# Patient Record
(demographics unavailable — no encounter records)

---

## 2025-01-22 NOTE — REASON FOR VISIT
[Initial Evaluation] : an initial evaluation [Patient] : patient [Mother] : mother [FreeTextEntry1] : Right distal radius and ulna fracture sustained on 1/10/2025

## 2025-01-22 NOTE — REVIEW OF SYSTEMS
[Change in Activity] : change in activity [Joint Pains] : arthralgias [Joint Swelling] : joint swelling  [Fever Above 102] : no fever [Malaise] : no malaise [Itching] : no itching [Redness] : no redness [Murmur] : no murmur [Congestion] : no congestion [Vomiting] : no vomiting [Diarrhea] : no diarrhea [Constipation] : no constipation [Kidney Infection] : no kidney infection [Bladder Infection] : no bladder infection [Sleep Disturbances] : ~T no sleep disturbances

## 2025-01-22 NOTE — HISTORY OF PRESENT ILLNESS
[FreeTextEntry1] : Won is an 06-[id number] male with a right distal radius and ulna fracture sustained on 1/10/2025.  Per report he tripped and fell on an outstretched hand.  He had immediate pain and discomfort and presented to [facility] where radiographs were obtained and the above fracture was noted.  He was placed into a long-arm cast.  Prior to leaving the emergency department his cast was windowed at the antecubital space.  It was recommended he follow-up with pediatric orthopedics.  Today, his mother reports he is overall doing well.  He has been trying to utilize his arm.  She denies any need for pain medication.  He can flex and extend his fingers without discomfort.  They present today for initial evaluation of his right distal radius and ulna fracture.

## 2025-01-22 NOTE — ASSESSMENT
[FreeTextEntry1] : 18-month-old male with a right distal radius and ulna fracture sustained on 1/10/2025, 7 days ago, when he tripped and fell.  -We discussed the history, physical exam, and all available radiographs at length during today's visit with patient and his parent/guardian who served as an independent historian due to child's age and unreliable nature of history. -Documentation from [facility] was reviewed today -2 views of the right forearm, one view of the right wrist and 2 views of the right elbow radiographs were obtained at [facility] on 1/10/25 and independently reviewed during today's visit.  Acute fractures of the distal radius and ulna. No additional fractures. No dislocation. Joint spaces are maintained. Subcutaneous soft tissue swelling over the volar aspect of the right wrist. No elbow joint effusion. Cast windowing in the antecubital fossa noted with concern for persistent skin indentation. -The etiology, pathoanatomy, treatment modalities, and expected natural history of the injury were discussed at length today. -Clinically, he is doing well and tolerating his long-arm cast without difficulty. He denies any pain in the cast at this time. -Due to concern for cast technique and overall quality, his long-arm cast was removed today for evaluation. An area of healing superficial skin injury noted over the volar aspect of the proximal forearm. No signs of bleeding or infection.  Xeroform was applied to the area.  Need for observation and possible scarring was discussed. -A new well-padded and molded long-arm cast was then applied to protect the skin injury and immobilize the fracture.  Cast care instructions were reviewed. -Right wrist 3 view radiographs were obtained and independently reviewed during today's visit.  Continued visualization of a distal radius and ulna fracture.  No signs of interval healing.  Fractures are well aligned.  Skeletally immature individual. -Nonweightbearing on the right upper extremity.  Sling at all times. -OTC NSAIDs as needed -Absolutely no playgrounds, bouncy houses or trampolines, etc. -We will plan to see him back in clinic in approximately 2 weeks for repeat clinical evaluation and new right wrist radiographs out of cast.  Anticipate transition to a wrist immobilizer at that time.   All questions and concerns were addressed today. Parent and patient verbalize understanding and agree with plan of care.   I, Jazlyn Melgoza, have acted as a scribe and documented the above information for Dr. Madera.   This note was created using Dragon Voice Recognition Software and may have been partially created using AI software which was then reviewed and edited to the best of my ability. Sporadic inaccurate translation may have occurred. If there are any questions about content of the note, please contact the office for clarification.

## 2025-01-22 NOTE — END OF VISIT
[FreeTextEntry3] : IBaron MD, personally saw and evaluated the patient and developed the plan as documented above. I concur or have edited the note as appropriate.

## 2025-01-22 NOTE — HISTORY OF PRESENT ILLNESS
[FreeTextEntry1] : Won is an 23-[id number]-rvg male with a right distal radius and ulna fracture sustained on 1/10/2025.  Per report he tripped and fell on an outstretched hand.  He had immediate pain and discomfort and presented to [facility] where radiographs were obtained and the above fracture was noted.  He was placed into a long-arm cast.  Prior to leaving the emergency department his cast was windowed at the antecubital space.  It was recommended he follow-up with pediatric orthopedics.  Today, his mother reports he is overall doing well.  He has been trying to utilize his arm.  She denies any need for pain medication.  He can flex and extend his fingers without discomfort.  They present today for initial evaluation of his right distal radius and ulna fracture.

## 2025-01-22 NOTE — PHYSICAL EXAM
[FreeTextEntry1] : GENERAL: alert, cooperative, in NAD SKIN: The skin is intact, warm, pink and dry over the area examined. EYES: Normal conjunctiva, normal eyelids and pupils were equal and round. ENT: normal ears, normal nose and normal lips. CARDIOVASCULAR: brisk capillary refill, but no peripheral edema. RESPIRATORY: The patient is in no apparent respiratory distress. They're taking full deep breaths without use of accessory muscles or evidence of audible wheezes or stridor without the use of a stethoscope. Normal respiratory effort. ABDOMEN: not examined.   RUE:  -Long arm cast was in place. Due to cast quality and radiographic appearance, cast was removed today for examination. -Healing superficial skin injury noted over the volar aspect of the proximal forearm. No signs of bleeding or infection. -Mild swelling about the wrist -Expected discomfort about the distal radius and ulna -Formal range of motion was deferred today though patient is seen actively moving elbow -Able to fully flex and extend all fingers without discomfort -Formal neuro exam deferred due to patient age.  Patient is seen moving all fingers without limitation. -Fingers are warm and appear well perfused with brisk capillary refill -2+ radial pulse -Sensation is grossly intact throughout the upper extremity, however, exam is limited due to patient age -No evidence of lymphedema

## 2025-01-22 NOTE — DATA REVIEWED
[de-identified] : 2 views of the right forearm, one view of the right wrist and 2 views of the right elbow radiographs were obtained at [facility] on 1/10/25 and independently reviewed during today's visit.  Acute fractures of the distal radius and ulna. No additional fractures. No dislocation. Joint spaces are maintained. Subcutaneous soft tissue swelling over the volar aspect of the right wrist. No elbow joint effusion. Cast windowing in the antecubital fossa noted with concern for persistent skin indentation.  Right wrist 3 view radiographs were obtained and independently reviewed during today's visit.  Continued visualization of a distal radius and ulna fracture.  No signs of interval healing.  Fractures are well aligned.  Skeletally immature individual.

## 2025-01-22 NOTE — DATA REVIEWED
[de-identified] : 2 views of the right forearm, one view of the right wrist and 2 views of the right elbow radiographs were obtained [place] [facility] [place]10/25 and independently reviewed during today's visit.  Acute fractures of the distal radius and ulna. No additional fractures. No dislocation. Joint spaces are maintained. Subcutaneous soft tissue swelling over the volar aspect of the right wrist. No elbow joint effusion. Cast windowing in the antecubital fossa noted with concern for persistent skin indentation.  Right wrist 3 view radiographs were obtained and independently reviewed during today's visit.  Continued visualization of a distal radius and ulna fracture.  No signs of interval healing.  Fractures are well aligned.  Skeletally immature individual.

## 2025-01-22 NOTE — ASSESSMENT
[FreeTextEntry1] : 18-month-old male with a right distal radius and ulna fracture sustained on 1/10/2025, 7 days ago, when he tripped and fell.  -We discussed the history, physical exam, and all available radiographs at length during today's visit with patient and his parent/guardian who served as an independent historian due to child's age and unreliable nature of history. -Documentation from [facility] was reviewed today -2 views of the right forearm, one view of the right wrist and 2 views of the right elbow radiographs were obtained at [facility] on 1/10/25 and independently reviewed during today's visit.  Acute fractures of the distal radius and ulna. No additional fractures. No dislocation. Joint spaces are maintained. Subcutaneous soft tissue swelling over the volar aspect of the right wrist. No elbow joint effusion. Cast windowing in the antecubital fossa noted with concern for persistent skin indentation. -The etiology, pathoanatomy, treatment modalities, and expected natural history of the injury were discussed at length today. -Clinically, he is doing well and tolerating his long-arm cast without difficulty. He denies any pain in the cast at this time. -Due to concern for cast technique and overall quality, his long-arm cast was removed today for evaluation. An area of healing superficial skin injury noted over the volar aspect of the proximal forearm. No signs of bleeding or infection.  Xeroform was applied to the area.  Need for observation and possible scarring was discussed. -A new well-padded and molded long-arm cast was then applied to protect the skin injury and immobilize the fracture.  Cast care instructions were reviewed. -Right wrist 3 view radiographs were obtained and independently reviewed during today's visit.  Continued visualization of a distal radius and ulna fracture.  No signs of interval healing.  Fractures are well aligned.  Skeletally immature individual. -Nonweightbearing on the right upper extremity.  Sling at all times. -OTC NSAIDs as needed -Absolutely no playgrounds, bouncy houses or trampolines, etc. -We will plan to see him back in clinic in approximately 2 weeks for repeat clinical evaluation and new right wrist radiographs out of cast.  Anticipate transition to a wrist immobilizer at that time.   All questions and concerns were addressed today. Parent and patient verbalize understanding and agree with plan of care.   I, Jazlyn Melgoza, have acted as a scribe and documented the above information for Dr. Madera.   This note was created using Dragon Voice Recognition Software and may have been partially created using AI software which was then reviewed and edited to the best of my ability. Sporadic inaccurate translation may have occurred. If there are any questions about content of the note, please contact the office for clarification.

## 2025-01-22 NOTE — PROCEDURE
[] : right long arm cast [de-identified] : Xeroform was applied to areas of skin injury about the volar forearm.

## 2025-01-31 NOTE — PHYSICAL EXAM
[FreeTextEntry1] : GENERAL: alert, cooperative, in NAD SKIN: The skin is intact, warm, pink and dry over the area examined. EYES: Normal conjunctiva, normal eyelids and pupils were equal and round. ENT: normal ears, normal nose and normal lips. CARDIOVASCULAR: brisk capillary refill, but no peripheral edema. RESPIRATORY: The patient is in no apparent respiratory distress. They're taking full deep breaths without use of accessory muscles or evidence of audible wheezes or stridor without the use of a stethoscope. Normal respiratory effort. ABDOMEN: not examined.   RUE:  -Long arm cast was in place. Cast was removed today  -Healing superficial skin injury noted over the volar aspect of the proximal forearm. No signs of bleeding or infection. -No further swelling about the wrist -No further discomfort about the distal radius and ulna -Formal range of motion was deferred today though patient is seen actively moving elbow and wrist -Able to fully flex and extend all fingers without discomfort -Formal neuro exam deferred due to patient age.  Patient is seen moving all fingers without limitation. -Fingers are warm and appear well perfused with brisk capillary refill -2+ radial pulse -Sensation is grossly intact throughout the upper extremity, however, exam is limited due to patient age -No evidence of lymphedema

## 2025-01-31 NOTE — HISTORY OF PRESENT ILLNESS
[FreeTextEntry1] : Won is a 16-[id number]-muc male with a right distal radius and ulna fracture sustained on 1/10/2025.  Per report he tripped and fell on an outstretched hand.  He had immediate pain and discomfort and presented to [facility] where radiographs were obtained and the above fracture was noted.  He was placed into a long-arm cast.  Prior to leaving the emergency department his cast was windowed at the antecubital space.  It was recommended he follow-up with pediatric orthopedics. On initial evaluation his long arm cast was removed due to condition of the cast.  An area of healing superficial skin injury noted over the volar aspect of the proximal forearm. No signs of bleeding or infection.  Xeroform was applied to the area.  Need for observation and possible scarring was discussed. He was then placed back into a long arm cast. Please see prior clinic notes for additional information.   Today, his father reports he is overall doing well.  He has been trying to utilize his arm.  He denies any need for pain medication.  He can flex and extend his fingers without discomfort. His father did report fevers since last visit that were associated with a respiratory infection. No increased pain or foul odor from the cast. They present today for continued management of his right distal radius and ulna fracture.

## 2025-01-31 NOTE — REASON FOR VISIT
[Follow Up] : a follow up visit [Patient] : patient [Father] : father [FreeTextEntry1] : Right distal radius and ulna fracture sustained on 1/10/2025

## 2025-01-31 NOTE — REVIEW OF SYSTEMS
[Change in Activity] : change in activity [Fever Above 102] : no fever [Malaise] : no malaise [Itching] : no itching [Redness] : no redness [Murmur] : no murmur [Congestion] : no congestion [Vomiting] : no vomiting [Diarrhea] : no diarrhea [Constipation] : no constipation [Kidney Infection] : no kidney infection [Bladder Infection] : no bladder infection [Joint Pains] : no arthralgias [Joint Swelling] : no joint swelling [Sleep Disturbances] : ~T no sleep disturbances

## 2025-01-31 NOTE — DATA REVIEWED
[de-identified] : Right wrist 3 view radiographs OUT OF CAST were obtained and independently reviewed during today's visit.  Continued visualization of a distal radius and ulna fracture.  Now with signs of interval healing.  Fractures are well aligned.  Skeletally immature individual.

## 2025-01-31 NOTE — ASSESSMENT
[FreeTextEntry1] : 19-month-old male with a right distal radius and ulna fracture sustained on 1/10/2025, 3 weeks ago, when he tripped and fell. Overall doing well. Healing skin injury noted.  -We discussed the interval progress, physical exam, and all available radiographs at length during today's visit with patient and his parent/guardian who served as an independent historian due to child's age and unreliable nature of history. -Right wrist 3 view radiographs OUT OF CAST were obtained and independently reviewed during today's visit.  Continued visualization of a distal radius and ulna fracture.  Now with signs of interval healing.  Fractures are well aligned.  Skeletally immature individual. -The etiology, pathoanatomy, treatment modalities, and expected natural history of the injury were again discussed at length today. -Clinically, he is doing well and tolerating his long-arm cast without difficulty. He denies any pain in the cast at this time. -His long-arm cast was removed today. Previous area of superficial skin injury noted over the volar aspect of the proximal forearm with progressive healing. No signs of bleeding or infection.  A dressing was applied to the area.  Need for observation and possible scarring was again discussed. -His long arm cast was removed today, and he tolerated the procedure well -He was then fitted and placed into a properly fitting wrist immobilizer. Brace care instructions reviewed. -Brace should be on at all times except for sleep, hygiene, and at the dinner table. In 1 week he can stop the brace at night time. -Additionally, he will remove the brace daily to work on ROM exercises. Sample exercises were demonstrated today. -No lifting anything heavier than a glass of water -OTC NSAIDs as needed -Absolutely no gym, recess, sports, rough play.  School note provided today. -We will plan to see him back in clinic in approximately 3 weeks for reevaluation and new right wrist radiographs. He should follow up sooner if there are any concerns for increased redness, pain or drainage from the skin irritation.   All questions and concerns were addressed today. Parent and patient verbalize understanding and agree with plan of care.   I, Jazlyn Melgoza, have acted as a scribe and documented the above information for Dr. Madera.   This note was created using Dragon Voice Recognition Software and may have been partially created using AI software which was then reviewed and edited to the best of my ability. Sporadic inaccurate translation may have occurred. If there are any questions about content of the note, please contact the office for clarification.

## 2025-02-25 NOTE — HISTORY OF PRESENT ILLNESS
[FreeTextEntry1] : Won is a 53-[id number]-gra male with a right distal radius and ulna fracture sustained on 1/10/2025.  Per report he tripped and fell on an outstretched hand.  He had immediate pain and discomfort and presented to [facility] where radiographs were obtained and the above fracture was noted.  He was placed into a long-arm cast.  Prior to leaving the emergency department his cast was windowed at the antecubital space.  It was recommended he follow-up with pediatric orthopedics. On initial evaluation his long arm cast was removed due to condition of the cast.  An area of healing superficial skin injury noted over the volar aspect of the proximal forearm. No signs of bleeding or infection.  Xeroform was applied to the area.  Need for observation and possible scarring was discussed. He was then placed back into a long arm cast. His long arm cast was removed on 1/31/25. Please see prior clinic notes for additional information.   Today, his mother reports he is overall doing well.  He has been utilizing his wrist immobilizer at all times.  She denies that he needs any need for pain medication.  He can flex and extend his fingers without discomfort. They present today for continued management of his right distal radius and ulna fracture.

## 2025-02-25 NOTE — ASSESSMENT
[FreeTextEntry1] : 19-month-old male with a right distal radius and ulna fracture sustained on 1/10/2025, 6 weeks ago, when he tripped and fell. Overall doing well. Healing skin injury noted.  -We discussed the interval progress, physical exam, and all available radiographs at length during today's visit with patient and his parent/guardian who served as an independent historian due to child's age and unreliable nature of history. -Right wrist 3 view radiographs were obtained and independently reviewed during today's visit.  Continued visualization of a distal radius and ulna fracture.  Now with signs of progressive interval healing.  Fractures are well aligned.  Skeletally immature individual. -The etiology, pathoanatomy, treatment modalities, and expected natural history of the injury were again discussed at length today. -Clinically, he is doing well and denies any pain at this time. He has full elbow and wrist range of motion. -Previous area of superficial skin injury noted over the volar aspect of the proximal forearm with progressive healing. No signs of bleeding or infection. Scar treatements were discussed today including silicone sheeting.  -He should continue with his brace while out of the house. His brace can be removed at home and while sleeping. Brace care instructions reviewed. -Additionally, he will remove the brace daily to work on ROM exercises. Sample exercises were demonstrated today. -No lifting anything heavier than a glass of water -OTC NSAIDs as needed -Absolutely no gym, recess, sports, rough play.  School note provided today. -Risks of reinjury discussed -We will plan to see him back in clinic in approximately 4 weeks for reevaluation and new right wrist radiographs. He should follow up sooner if there are any concerns for increased redness, pain or drainage from the skin irritation.    All questions and concerns were addressed today. Parent and patient verbalize understanding and agree with plan of care.   I, Jazlyn Melgoza, have acted as a scribe and documented the above information for Dr. Madera.   This note was created using Dragon Voice Recognition Software and may have been partially created using [employer] software which was then reviewed and edited to the best of my ability. Sporadic inaccurate translation may have occurred. If there are any questions about content of the note, please contact the office for clarification.

## 2025-02-25 NOTE — REASON FOR VISIT
[Follow Up] : a follow up visit [Patient] : patient [Mother] : mother [FreeTextEntry1] : Right distal radius and ulna fracture sustained on 1/10/2025

## 2025-02-25 NOTE — PHYSICAL EXAM
[FreeTextEntry1] : GENERAL: alert, cooperative, in NAD SKIN: The skin is intact, warm, pink and dry over the area examined. EYES: Normal conjunctiva, normal eyelids and pupils were equal and round. ENT: normal ears, normal nose and normal lips. CARDIOVASCULAR: brisk capillary refill, but no peripheral edema. RESPIRATORY: The patient is in no apparent respiratory distress. They're taking full deep breaths without use of accessory muscles or evidence of audible wheezes or stridor without the use of a stethoscope. Normal respiratory effort. ABDOMEN: not examined.   RUE: -No gross deformity -Healing superficial skin injury noted over the volar aspect of the proximal forearm. No signs of bleeding or infection. -No further swelling about the wrist -No further discomfort about the distal radius and ulna -Full elbow and wrist range of motion -Able to fully flex and extend all fingers without discomfort -Formal neuro exam deferred due to patient age.  Patient is seen moving all fingers without limitation. -Fingers are warm and appear well perfused with brisk capillary refill -2+ radial pulse -Sensation is grossly intact throughout the upper extremity, however, exam is limited due to patient age -No evidence of lymphedema

## 2025-02-25 NOTE — HISTORY OF PRESENT ILLNESS
[FreeTextEntry1] : Won is a 67-[id number]-wrv male with a right distal radius and ulna fracture sustained on 1/10/2025.  Per report he tripped and fell on an outstretched hand.  He had immediate pain and discomfort and presented to [facility] where radiographs were obtained and the above fracture was noted.  He was placed into a long-arm cast.  Prior to leaving the emergency department his cast was windowed at the antecubital space.  It was recommended he follow-up with pediatric orthopedics. On initial evaluation his long arm cast was removed due to condition of the cast.  An area of healing superficial skin injury noted over the volar aspect of the proximal forearm. No signs of bleeding or infection.  Xeroform was applied to the area.  Need for observation and possible scarring was discussed. He was then placed back into a long arm cast. His long arm cast was removed on 1/31/25. Please see prior clinic notes for additional information.   Today, his mother reports he is overall doing well.  He has been utilizing his wrist immobilizer at all times.  She denies that he needs any need for pain medication.  He can flex and extend his fingers without discomfort. They present today for continued management of his right distal radius and ulna fracture.

## 2025-02-25 NOTE — DATA REVIEWED
[de-identified] : Right wrist 3 view radiographs were obtained and independently reviewed during today's visit.  Continued visualization of a distal radius and ulna fracture.  Now with signs of progressive interval healing.  Fractures are well aligned.  Skeletally immature individual.

## 2025-02-25 NOTE — DATA REVIEWED
[de-identified] : Right wrist 3 view radiographs were obtained and independently reviewed during today's visit.  Continued visualization of a distal radius and ulna fracture.  Now with signs of progressive interval healing.  Fractures are well aligned.  Skeletally immature individual.

## 2025-02-25 NOTE — ASSESSMENT
[FreeTextEntry1] : 19-month-old male with a right distal radius and ulna fracture sustained on 1/10/2025, 6 weeks ago, when he tripped and fell. Overall doing well. Healing skin injury noted.  -We discussed the interval progress, physical exam, and all available radiographs at length during today's visit with patient and his parent/guardian who served as an independent historian due to child's age and unreliable nature of history. -Right wrist 3 view radiographs were obtained and independently reviewed during today's visit.  Continued visualization of a distal radius and ulna fracture.  Now with signs of progressive interval healing.  Fractures are well aligned.  Skeletally immature individual. -The etiology, pathoanatomy, treatment modalities, and expected natural history of the injury were again discussed at length today. -Clinically, he is doing well and denies any pain at this time. He has full elbow and wrist range of motion. -Previous area of superficial skin injury noted over the volar aspect of the proximal forearm with progressive healing. No signs of bleeding or infection. Scar treatements were discussed today including silicone sheeting.  -He should continue with his brace while out of the house. His brace can be removed at home and while sleeping. Brace care instructions reviewed. -Additionally, he will remove the brace daily to work on ROM exercises. Sample exercises were demonstrated today. -No lifting anything heavier than a glass of water -OTC NSAIDs as needed -Absolutely no gym, recess, sports, rough play.  School note provided today. -Risks of reinjury discussed -We will plan to see him back in clinic in approximately 4 weeks for reevaluation and new right wrist radiographs. He should follow up sooner if there are any concerns for increased redness, pain or drainage from the skin irritation.    All questions and concerns were addressed today. Parent and patient verbalize understanding and agree with plan of care.   I, Jazlyn Melgoza, have acted as a scribe and documented the above information for Dr. Madera.   This note was created using Dragon Voice Recognition Software and may have been partially created using [employer] software which was then reviewed and edited to the best of my ability. Sporadic inaccurate translation may have occurred. If there are any questions about content of the note, please contact the office for clarification.

## 2025-03-30 NOTE — HISTORY OF PRESENT ILLNESS
[FreeTextEntry1] : Won is a 19-[id number]-oss male with a right distal radius and ulna fracture sustained on 1/10/2025.  Per report he tripped and fell on an outstretched hand.  He had immediate pain and discomfort and presented to [facility] where radiographs were obtained and the above fracture was noted.  He was placed into a long-arm cast.  Prior to leaving the emergency department his cast was windowed at the antecubital space.  It was recommended he follow-up with pediatric orthopedics. On initial evaluation his long arm cast was removed due to condition of the cast.  An area of healing superficial skin injury noted over the volar aspect of the proximal forearm. No signs of bleeding or infection.  Xeroform was applied to the area.  Need for observation and possible scarring was discussed. He was then placed back into a long arm cast. His long arm cast was removed on 1/31/25. On 2/20/25 his brace was continued while out of the house only. Please see prior clinic notes for additional information.   Today, his mother reports he is overall doing well.  He has not been utilizing his wrist immobilizer for the last week.  She denies that he needs any need for pain medication.  He can flex and extend his fingers without discomfort. They present today for continued management of his right distal radius and ulna fracture.

## 2025-03-30 NOTE — DATA REVIEWED
[de-identified] : Right wrist 3 view radiographs were obtained and independently reviewed during today's visit.  Well healed distal radius and ulna fracture.  Skeletally immature individual.

## 2025-03-30 NOTE — REVIEW OF SYSTEMS
[Change in Activity] : change in activity [Fever Above 102] : no fever [Malaise] : no malaise [Itching] : no itching [Redness] : no redness [Murmur] : no murmur [Congestion] : no congestion [Vomiting] : no vomiting [Diarrhea] : no diarrhea [Constipation] : no constipation [Kidney Infection] : no kidney infection [Bladder Infection] : no bladder infection [Limping] : no limping [Joint Pains] : no arthralgias [Joint Swelling] : no joint swelling [Sleep Disturbances] : ~T no sleep disturbances

## 2025-03-30 NOTE — HISTORY OF PRESENT ILLNESS
[FreeTextEntry1] : Won is a 96-sajfq-ubw male with a right distal radius and ulna fracture sustained on 1/10/2025.  Per report he tripped and fell on an outstretched hand.  He had immediate pain and discomfort and presented to [facility] where radiographs were obtained and the above fracture was noted.  He was placed into a long-arm cast.  Prior to leaving the emergency department his cast was windowed at the antecubital space.  It was recommended he follow-up with pediatric orthopedics. On initial evaluation his long arm cast was removed due to condition of the cast.  An area of healing superficial skin injury noted over the volar aspect of the proximal forearm. No signs of bleeding or infection.  Xeroform was applied to the area.  Need for observation and possible scarring was discussed. He was then placed back into a long arm cast. His long arm cast was removed on 1/31/25. On 2/20/25 his brace was continued while out of the house only. Please see prior clinic notes for additional information.   Today, his mother reports he is overall doing well.  He has not been utilizing his wrist immobilizer for the last week.  She denies that he needs any need for pain medication.  He can flex and extend his fingers without discomfort. They present today for continued management of his right distal radius and ulna fracture.

## 2025-03-30 NOTE — PHYSICAL EXAM
[FreeTextEntry1] : GENERAL: alert, cooperative, in NAD SKIN: The skin is intact, warm, pink and dry over the area examined. EYES: Normal conjunctiva, normal eyelids and pupils were equal and round. ENT: normal ears, normal nose and normal lips. CARDIOVASCULAR: brisk capillary refill, but no peripheral edema. RESPIRATORY: The patient is in no apparent respiratory distress. They're taking full deep breaths without use of accessory muscles or evidence of audible wheezes or stridor without the use of a stethoscope. Normal respiratory effort. ABDOMEN: not examined.   RUE: -No gross deformity -Healed superficial skin injury noted over the volar aspect of the proximal forearm. No signs of infection. Area/scar is noted to be fading. -No swelling about the wrist -No discomfort about the distal radius and ulna -Full elbow and wrist range of motion -Able to fully flex and extend all fingers without discomfort -Formal neuro exam deferred due to patient age.  Patient is seen moving all fingers without limitation. -Fingers are warm and appear well perfused with brisk capillary refill -2+ radial pulse -Sensation is grossly intact throughout the upper extremity, however, exam is limited due to patient age -No evidence of lymphedema

## 2025-03-30 NOTE — ASSESSMENT
[FreeTextEntry1] : 20-month-old male with a right distal radius and ulna fracture sustained on 1/10/2025, 10 weeks ago, when he tripped and fell. Overall doing well. Healing skin injury noted.  -We discussed the interval progress, physical exam, and all available radiographs at length during today's visit with patient and his parent/guardian who served as an independent historian due to child's age and unreliable nature of history. -Right wrist 3 view radiographs were obtained and independently reviewed during today's visit.  Well healed distal radius and ulna fracture.  Skeletally immature individual. -The etiology, pathoanatomy, treatment modalities, and expected natural history of the injury were again discussed at length today. -Clinically, he is doing well and denies any pain at this time. He has full elbow and wrist range of motion. -Previous area of superficial skin injury noted over the volar aspect of the proximal forearm with progressive healing. No signs of bleeding or infection. Scar treatments were again discussed today. -He may fully discontinue his wrist immobilizer at this time.  -He can weight bear as tolerated on the right upper extremity -He may return to activity as tolerated.  School note provided today. -Risks of reinjury discussed -We will plan to see him back in clinic in approximately 3 months for reevaluation and new right wrist radiographs. He should follow up sooner if there are any concerns for increased redness, pain or drainage from the skin irritation.    All questions and concerns were addressed today. Parent and patient verbalize understanding and agree with plan of care.   I, Jazlyn Melgoza, have acted as a scribe and documented the above information for Dr. Madera.   This note was created using Dragon Voice Recognition Software and may have been partially created using [employer] software which was then reviewed and edited to the best of my ability. Sporadic inaccurate translation may have occurred. If there are any questions about content of the note, please contact the office for clarification.

## 2025-03-30 NOTE — DATA REVIEWED
[de-identified] : Right wrist 3 view radiographs were obtained and independently reviewed during today's visit.  Well healed distal radius and ulna fracture.  Skeletally immature individual.

## 2025-03-30 NOTE — ASSESSMENT
[FreeTextEntry1] : 20-month-old male with a right distal radius and ulna fracture sustained on 1/10/2025, 10 weeks ago, when he tripped and fell. Overall doing well. Healing skin injury noted.  -We discussed the interval progress, physical exam, and all available radiographs at length during today's visit with patient and his parent/guardian who served as an independent historian due to child's age and unreliable nature of history. -Right wrist 3 view radiographs were obtained and independently reviewed during today's visit.  Well healed distal radius and ulna fracture.  Skeletally immature individual. -The etiology, pathoanatomy, treatment modalities, and expected natural history of the injury were again discussed at length today. -Clinically, he is doing well and denies any pain at this time. He has full elbow and wrist range of motion. -Previous area of superficial skin injury noted over the volar aspect of the proximal forearm with progressive healing. No signs of bleeding or infection. Scar treatments were again discussed today. -He may fully discontinue his wrist immobilizer at this time.  -He can weight bear as tolerated on the right upper extremity -He may return to activity as tolerated.  School note provided today. -Risks of reinjury discussed -We will plan to see him back in clinic in approximately 3 months for reevaluation and new right wrist radiographs. He should follow up sooner if there are any concerns for increased redness, pain or drainage from the skin irritation.    All questions and concerns were addressed today. Parent and patient verbalize understanding and agree with plan of care.   I, Jazlyn Melgoza, have acted as a scribe and documented the above information for Dr. Madera.   This note was created using Dragon Voice Recognition Software and may have been partially created using Gro[employer] software which was then reviewed and edited to the best of my ability. Sporadic inaccurate translation may have occurred. If there are any questions about content of the note, please contact the office for clarification.

## 2025-06-27 NOTE — ASSESSMENT
[FreeTextEntry1] : 23-month-old male with a right distal radius and ulna fracture sustained on 1/10/2025, 5.5 months ago, when he tripped and fell. Overall doing well.  -We discussed the interval progress, physical exam, and all available radiographs at length during today's visit with patient and his parent/guardian who served as an independent historian due to child's age and unreliable nature of history. -Right wrist 3 view radiographs were obtained and independently reviewed during today's visit.  Well healed distal radius and ulna fracture.  Skeletally immature individual. -The etiology, pathoanatomy, treatment modalities, and expected natural history of the injury were again discussed at length today. -Clinically, he is doing well and denies any pain at this time. He has full elbow and wrist range of motion. -Previous area of superficial skin injury noted over the volar aspect of the proximal forearm is well healed. -He can weight bear as tolerated on the right upper extremity -He may continue with activity as tolerated.  School note provided today. -Risks of reinjury discussed -We will plan to see him back in clinic on an as needed basis or if new concerns arise.   All questions and concerns were addressed today. Parent and patient verbalize understanding and agree with plan of care.   I, Jazlyn Melgoza, have acted as a scribe and documented the above information for Dr. Madera.   This note was created using Dragon Voice Recognition Software and may have been partially created using [email or web address] software which was then reviewed and edited to the best of my ability. Sporadic inaccurate translation may have occurred. If there are any questions about content of the note, please contact the office for clarification.

## 2025-06-27 NOTE — HISTORY OF PRESENT ILLNESS
[FreeTextEntry1] : Won is a 17-[id number] male with a right distal radius and ulna fracture sustained on 1/10/2025.  Per report he tripped and fell on an outstretched hand.  He had immediate pain and discomfort and presented to [facility] where radiographs were obtained and the above fracture was noted.  He was placed into a long-arm cast.  Prior to leaving the emergency department his cast was windowed at the antecubital space.  It was recommended he follow-up with pediatric orthopedics. On initial evaluation his long arm cast was removed due to condition of the cast.  An area of healing superficial skin injury noted over the volar aspect of the proximal forearm. No signs of bleeding or infection.  Xeroform was applied to the area.  Need for observation and possible scarring was discussed. He was then placed back into a long arm cast. His long arm cast was removed on 1/31/25. On 2/20/25 his brace was continued while out of the house only. His brace was fully discontinued on 3/21/25. Please see prior clinic notes for additional information.   Today, his mother reports he is overall doing well. He has full motion of the arm. His pressure injury is well healed. He has no pain. He denies that he needs any need for pain medication.  He can flex and extend his fingers without discomfort. They present today for continued management of his right distal radius and ulna fracture.

## 2025-06-27 NOTE — PHYSICAL EXAM
[FreeTextEntry1] : GENERAL: alert, cooperative, in NAD SKIN: The skin is intact, warm, pink and dry over the area examined. EYES: Normal conjunctiva, normal eyelids and pupils were equal and round. ENT: normal ears, normal nose and normal lips. CARDIOVASCULAR: brisk capillary refill, but no peripheral edema. RESPIRATORY: The patient is in no apparent respiratory distress. They're taking full deep breaths without use of accessory muscles or evidence of audible wheezes or stridor without the use of a stethoscope. Normal respiratory effort. ABDOMEN: not examined.   RUE: -No gross deformity -Healed superficial skin injury noted over the volar aspect of the proximal forearm. No signs of infection. Scar is noted to be fading. -No swelling about the wrist -No discomfort about the distal radius and ulna -Full elbow and wrist range of motion -Full/symmetric forearm pronation/supination -Able to fully flex and extend all fingers without discomfort -Formal neuro exam deferred due to patient age.  Patient is seen moving all fingers without limitation. -Fingers are warm and appear well perfused with brisk capillary refill -2+ radial pulse -Sensation is grossly intact throughout the upper extremity, however, exam is limited due to patient age -No evidence of lymphedema

## 2025-06-27 NOTE — DATA REVIEWED
[de-identified] : Right wrist 3 view radiographs were obtained and independently reviewed during today's visit.  Well healed distal radius and ulna fracture.  Skeletally immature individual.

## 2025-06-27 NOTE — DATA REVIEWED
[de-identified] : Right wrist 3 view radiographs were obtained and independently reviewed during today's visit.  Well healed distal radius and ulna fracture.  Skeletally immature individual.

## 2025-06-27 NOTE — ASSESSMENT
[FreeTextEntry1] : 23-month-old male with a right distal radius and ulna fracture sustained on 1/10/2025, 5.5 months ago, when he tripped and fell. Overall doing well.  -We discussed the interval progress, physical exam, and all available radiographs at length during today's visit with patient and his parent/guardian who served as an independent historian due to child's age and unreliable nature of history. -Right wrist 3 view radiographs were obtained and independently reviewed during today's visit.  Well healed distal radius and ulna fracture.  Skeletally immature individual. -The etiology, pathoanatomy, treatment modalities, and expected natural history of the injury were again discussed at length today. -Clinically, he is doing well and denies any pain at this time. He has full elbow and wrist range of motion. -Previous area of superficial skin injury noted over the volar aspect of the proximal forearm is well healed. -He can weight bear as tolerated on the right upper extremity -He may continue with activity as tolerated.  School note provided today. -Risks of reinjury discussed -We will plan to see him back in clinic on an as needed basis or if new concerns arise.   All questions and concerns were addressed today. Parent and patient verbalize understanding and agree with plan of care.   I, Jazlyn Melgoza, have acted as a scribe and documented the above information for Dr. Madera.   This note was created using Dragon Voice Recognition Software and may have been partially created using [employer] software which was then reviewed and edited to the best of my ability. Sporadic inaccurate translation may have occurred. If there are any questions about content of the note, please contact the office for clarification.

## 2025-06-27 NOTE — REVIEW OF SYSTEMS
[Change in Activity] : no change in activity [Fever Above 102] : no fever [Malaise] : no malaise [Itching] : no itching [Redness] : no redness [Murmur] : no murmur [Congestion] : no congestion [Vomiting] : no vomiting [Diarrhea] : no diarrhea [Constipation] : no constipation [Kidney Infection] : no kidney infection [Bladder Infection] : no bladder infection [Limping] : no limping [Joint Pains] : no arthralgias [Joint Swelling] : no joint swelling [Sleep Disturbances] : ~T no sleep disturbances

## 2025-06-27 NOTE — HISTORY OF PRESENT ILLNESS
[FreeTextEntry1] : Won is a 37-[id number]-nsm male with a right distal radius and ulna fracture sustained on 1/10/2025.  Per report he tripped and fell on an outstretched hand.  He had immediate pain and discomfort and presented to [facility] where radiographs were obtained and the above fracture was noted.  He was placed into a long-arm cast.  Prior to leaving the emergency department his cast was windowed at the antecubital space.  It was recommended he follow-up with pediatric orthopedics. On initial evaluation his long arm cast was removed due to condition of the cast.  An area of healing superficial skin injury noted over the volar aspect of the proximal forearm. No signs of bleeding or infection.  Xeroform was applied to the area.  Need for observation and possible scarring was discussed. He was then placed back into a long arm cast. His long arm cast was removed on 1/31/25. On 2/20/25 his brace was continued while out of the house only. His brace was fully discontinued on 3/21/25. Please see prior clinic notes for additional information.   Today, his mother reports he is overall doing well. He has full motion of the arm. His pressure injury is well healed. He has no pain. He denies that he needs any need for pain medication.  He can flex and extend his fingers without discomfort. They present today for continued management of his right distal radius and ulna fracture.